# Patient Record
Sex: MALE | Race: WHITE
[De-identification: names, ages, dates, MRNs, and addresses within clinical notes are randomized per-mention and may not be internally consistent; named-entity substitution may affect disease eponyms.]

---

## 2018-12-30 ENCOUNTER — HOSPITAL ENCOUNTER (EMERGENCY)
Dept: HOSPITAL 80 - FED | Age: 23
LOS: 1 days | Discharge: HOME | End: 2018-12-31
Payer: COMMERCIAL

## 2018-12-30 VITALS — SYSTOLIC BLOOD PRESSURE: 136 MMHG | DIASTOLIC BLOOD PRESSURE: 93 MMHG
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
## 2018-12-30 DIAGNOSIS — S32.028A: Primary | ICD-10-CM
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
## 2018-12-30 DIAGNOSIS — S22.060A: ICD-10-CM

## 2018-12-30 DIAGNOSIS — E86.9: ICD-10-CM

## 2018-12-30 DIAGNOSIS — S32.038A: ICD-10-CM
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
## 2018-12-30 LAB
INR PPP: 0.97 (ref 0.83–1.16)
PLATELET # BLD: 301 10^3/UL (ref 150–400)
PROTHROMBIN TIME: 13.1 SEC (ref 12–15)
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
